# Patient Record
Sex: FEMALE | Race: WHITE | Employment: OTHER | URBAN - METROPOLITAN AREA
[De-identification: names, ages, dates, MRNs, and addresses within clinical notes are randomized per-mention and may not be internally consistent; named-entity substitution may affect disease eponyms.]

---

## 2022-10-25 ENCOUNTER — TRANSCRIBE ORDER (OUTPATIENT)
Dept: SCHEDULING | Age: 47
End: 2022-10-25

## 2022-10-25 DIAGNOSIS — M50.30 DDD (DEGENERATIVE DISC DISEASE), CERVICAL: Primary | ICD-10-CM

## 2022-11-29 ENCOUNTER — HOSPITAL ENCOUNTER (OUTPATIENT)
Dept: MAMMOGRAPHY | Age: 47
Discharge: HOME | End: 2022-11-29
Attending: FAMILY MEDICINE
Payer: COMMERCIAL

## 2022-11-29 ENCOUNTER — HOSPITAL ENCOUNTER (OUTPATIENT)
Dept: MRI IMAGING | Age: 47
Discharge: HOME | End: 2022-11-29
Attending: FAMILY MEDICINE
Payer: COMMERCIAL

## 2022-11-29 DIAGNOSIS — M50.30 DDD (DEGENERATIVE DISC DISEASE), CERVICAL: ICD-10-CM

## 2022-11-29 DIAGNOSIS — Z12.31 OTHER SCREENING MAMMOGRAM: ICD-10-CM

## 2022-11-29 PROCEDURE — 72141 MRI NECK SPINE W/O DYE: CPT

## 2022-11-29 PROCEDURE — 77063 BREAST TOMOSYNTHESIS BI: CPT

## 2024-01-16 ENCOUNTER — HOSPITAL ENCOUNTER (EMERGENCY)
Facility: HOSPITAL | Age: 49
Discharge: HOME | End: 2024-01-16
Attending: EMERGENCY MEDICINE
Payer: COMMERCIAL

## 2024-01-16 VITALS
HEIGHT: 66 IN | BODY MASS INDEX: 32.72 KG/M2 | HEART RATE: 77 BPM | OXYGEN SATURATION: 99 % | RESPIRATION RATE: 16 BRPM | TEMPERATURE: 97.2 F | WEIGHT: 203.6 LBS | DIASTOLIC BLOOD PRESSURE: 97 MMHG | SYSTOLIC BLOOD PRESSURE: 159 MMHG

## 2024-01-16 DIAGNOSIS — R10.13 ABDOMINAL PAIN, EPIGASTRIC: Primary | ICD-10-CM

## 2024-01-16 LAB
ALBUMIN SERPL-MCNC: 3.8 G/DL (ref 3.5–5)
ALBUMIN/GLOB SERPL: 1 (ref 1.1–2.2)
ALP SERPL-CCNC: 108 U/L (ref 45–117)
ALT SERPL-CCNC: 26 U/L (ref 12–78)
ANION GAP SERPL CALC-SCNC: 14 MMOL/L (ref 5–15)
AST SERPL W P-5'-P-CCNC: 28 U/L (ref 15–37)
BASOPHILS # BLD: 0.1 K/UL (ref 0–0.1)
BASOPHILS NFR BLD: 1 % (ref 0–1)
BILIRUB SERPL-MCNC: 0.4 MG/DL (ref 0.2–1)
BUN SERPL-MCNC: 17 MG/DL (ref 6–20)
BUN/CREAT SERPL: 22 (ref 12–20)
CA-I BLD-MCNC: 9.1 MG/DL (ref 8.5–10.1)
CHLORIDE SERPL-SCNC: 103 MMOL/L (ref 97–108)
CO2 SERPL-SCNC: 23 MMOL/L (ref 21–32)
CREAT SERPL-MCNC: 0.77 MG/DL (ref 0.55–1.02)
DIFFERENTIAL METHOD BLD: ABNORMAL
EOSINOPHIL # BLD: 0.7 K/UL (ref 0–0.4)
EOSINOPHIL NFR BLD: 9 % (ref 0–7)
ERYTHROCYTE [DISTWIDTH] IN BLOOD BY AUTOMATED COUNT: 13.5 % (ref 11.5–14.5)
GLOBULIN SER CALC-MCNC: 3.7 G/DL (ref 2–4)
GLUCOSE SERPL-MCNC: 107 MG/DL (ref 65–100)
HCT VFR BLD AUTO: 37.1 % (ref 35–47)
HGB BLD-MCNC: 12.7 G/DL (ref 11.5–16)
IMM GRANULOCYTES # BLD AUTO: 0 K/UL (ref 0–0.04)
IMM GRANULOCYTES NFR BLD AUTO: 0 % (ref 0–0.5)
LIPASE SERPL-CCNC: 32 U/L (ref 13–75)
LYMPHOCYTES # BLD: 2.4 K/UL (ref 0.8–3.5)
LYMPHOCYTES NFR BLD: 31 % (ref 12–49)
MCH RBC QN AUTO: 30.8 PG (ref 26–34)
MCHC RBC AUTO-ENTMCNC: 34.2 G/DL (ref 30–36.5)
MCV RBC AUTO: 90 FL (ref 80–99)
MONOCYTES # BLD: 0.4 K/UL (ref 0–1)
MONOCYTES NFR BLD: 5 % (ref 5–13)
NEUTS SEG # BLD: 4.2 K/UL (ref 1.8–8)
NEUTS SEG NFR BLD: 54 % (ref 32–75)
NRBC # BLD: 0 K/UL (ref 0–0.01)
NRBC BLD-RTO: 0 PER 100 WBC
PLATELET # BLD AUTO: 250 K/UL (ref 150–400)
PMV BLD AUTO: 10.2 FL (ref 8.9–12.9)
POTASSIUM SERPL-SCNC: 3.5 MMOL/L (ref 3.5–5.1)
PROT SERPL-MCNC: 7.5 G/DL (ref 6.4–8.2)
RBC # BLD AUTO: 4.12 M/UL (ref 3.8–5.2)
SODIUM SERPL-SCNC: 140 MMOL/L (ref 136–145)
TROPONIN I SERPL HS-MCNC: 13 NG/L (ref 0–51)
WBC # BLD AUTO: 7.7 K/UL (ref 3.6–11)

## 2024-01-16 PROCEDURE — 99284 EMERGENCY DEPT VISIT MOD MDM: CPT

## 2024-01-16 PROCEDURE — 2580000003 HC RX 258: Performed by: EMERGENCY MEDICINE

## 2024-01-16 PROCEDURE — 93005 ELECTROCARDIOGRAM TRACING: CPT | Performed by: EMERGENCY MEDICINE

## 2024-01-16 PROCEDURE — 83690 ASSAY OF LIPASE: CPT

## 2024-01-16 PROCEDURE — 84484 ASSAY OF TROPONIN QUANT: CPT

## 2024-01-16 PROCEDURE — 85025 COMPLETE CBC W/AUTO DIFF WBC: CPT

## 2024-01-16 PROCEDURE — 36415 COLL VENOUS BLD VENIPUNCTURE: CPT

## 2024-01-16 PROCEDURE — 2500000003 HC RX 250 WO HCPCS: Performed by: EMERGENCY MEDICINE

## 2024-01-16 PROCEDURE — A4216 STERILE WATER/SALINE, 10 ML: HCPCS | Performed by: EMERGENCY MEDICINE

## 2024-01-16 PROCEDURE — 80053 COMPREHEN METABOLIC PANEL: CPT

## 2024-01-16 PROCEDURE — 96374 THER/PROPH/DIAG INJ IV PUSH: CPT

## 2024-01-16 RX ORDER — LISINOPRIL 10 MG/1
TABLET ORAL
COMMUNITY

## 2024-01-16 RX ORDER — BUTALBITAL, ACETAMINOPHEN AND CAFFEINE 50; 325; 40 MG/1; MG/1; MG/1
TABLET ORAL
COMMUNITY
Start: 2024-01-14

## 2024-01-16 RX ORDER — NICOTINE POLACRILEX 4 MG/1
20 GUM, CHEWING ORAL 2 TIMES DAILY
COMMUNITY

## 2024-01-16 RX ORDER — DULOXETIN HYDROCHLORIDE 20 MG/1
CAPSULE, DELAYED RELEASE ORAL
COMMUNITY
Start: 2024-01-16

## 2024-01-16 RX ADMIN — FAMOTIDINE 20 MG: 10 INJECTION, SOLUTION INTRAVENOUS at 19:46

## 2024-01-16 ASSESSMENT — ENCOUNTER SYMPTOMS
RESPIRATORY NEGATIVE: 1
VOMITING: 0
NAUSEA: 0
ABDOMINAL PAIN: 1
BLOOD IN STOOL: 0

## 2024-01-16 ASSESSMENT — PAIN DESCRIPTION - DESCRIPTORS: DESCRIPTORS: DULL;SHARP

## 2024-01-16 ASSESSMENT — LIFESTYLE VARIABLES
HOW OFTEN DO YOU HAVE A DRINK CONTAINING ALCOHOL: MONTHLY OR LESS
HOW MANY STANDARD DRINKS CONTAINING ALCOHOL DO YOU HAVE ON A TYPICAL DAY: 1 OR 2

## 2024-01-16 ASSESSMENT — PAIN DESCRIPTION - ORIENTATION: ORIENTATION: MID;UPPER

## 2024-01-16 ASSESSMENT — PAIN - FUNCTIONAL ASSESSMENT
PAIN_FUNCTIONAL_ASSESSMENT: ACTIVITIES ARE NOT PREVENTED
PAIN_FUNCTIONAL_ASSESSMENT: 0-10

## 2024-01-16 ASSESSMENT — PAIN DESCRIPTION - PAIN TYPE: TYPE: ACUTE PAIN

## 2024-01-16 ASSESSMENT — PAIN DESCRIPTION - LOCATION: LOCATION: ABDOMEN

## 2024-01-16 ASSESSMENT — PAIN DESCRIPTION - FREQUENCY: FREQUENCY: INTERMITTENT

## 2024-01-16 ASSESSMENT — PAIN SCALES - GENERAL: PAINLEVEL_OUTOF10: 5

## 2024-01-17 DIAGNOSIS — K21.9 GASTROESOPHAGEAL REFLUX DISEASE WITHOUT ESOPHAGITIS: Primary | ICD-10-CM

## 2024-01-17 DIAGNOSIS — R73.03 PREDIABETES: ICD-10-CM

## 2024-01-17 PROBLEM — R10.9 ABDOMINAL PAIN: Status: ACTIVE | Noted: 2024-01-17

## 2024-01-17 PROBLEM — F32.A DEPRESSION: Status: ACTIVE | Noted: 2024-01-17

## 2024-01-17 PROBLEM — J45.909 ASTHMA: Status: ACTIVE | Noted: 2024-01-17

## 2024-01-17 LAB
EKG ATRIAL RATE: 71 BPM
EKG DIAGNOSIS: NORMAL
EKG P AXIS: 20 DEGREES
EKG P-R INTERVAL: 138 MS
EKG Q-T INTERVAL: 418 MS
EKG QRS DURATION: 94 MS
EKG QTC CALCULATION (BAZETT): 455 MS
EKG R AXIS: 14 DEGREES
EKG T AXIS: 2 DEGREES
EKG VENTRICULAR RATE: 71 BPM

## 2024-01-17 RX ORDER — TOPIRAMATE 50 MG/1
50 TABLET, FILM COATED ORAL DAILY
COMMUNITY

## 2024-01-17 NOTE — ED NOTES
[facility] EMERGENCY DEPT  EMERGENCY DEPARTMENT ENCOUNTER      Pt Name: Chloe Soriano  MRN: [id number]  Birthdate 1975  Date of evaluation: 1/16/2024  Provider: Jose Cruz MD  7:50 PM    CHIEF COMPLAINT       Chief Complaint   Patient presents with    Abdominal Pain         HISTORY OF PRESENT ILLNESS    Chloe Soriano is a 48 y.o. female with history of ulcers, hypertension and depression, who presents to the emergency department with complaint of epigastric abdominal pain that gradually started 2 months ago, but intensified tonight.  Pain became severe but at present is 0-1/10.  She took omeprazole for years and then stopped for a long time and restarted it recently.  All along, pain did not radiate but tonight it seemed to radiate to the left upper quadrant.  She denies hematemesis or tarry stools.      Nursing Notes were reviewed.    REVIEW OF SYSTEMS       Review of Systems   Constitutional: Negative.    HENT: Negative.     Respiratory: Negative.     Cardiovascular: Negative.    Gastrointestinal:  Positive for abdominal pain. Negative for blood in stool, nausea and vomiting.   Endocrine: Negative.    Genitourinary: Negative.    Musculoskeletal: Negative.    Neurological: Negative.    Hematological: Negative.        Except as noted above the remainder of the review of systems was reviewed and negative.       PAST MEDICAL HISTORY     Past Medical History:   Diagnosis Date    Colitis     Hypertension     Migraine          SURGICAL HISTORY       Past Surgical History:   Procedure Laterality Date    HYSTERECTOMY (CERVIX STATUS UNKNOWN)           CURRENT MEDICATIONS       Previous Medications    BUTALBITAL-ACETAMINOPHEN-CAFFEINE (FIORICET, ESGIC) -40 MG PER TABLET        DULOXETINE (CYMBALTA) 20 MG EXTENDED RELEASE CAPSULE        LISINOPRIL (PRINIVIL;ZESTRIL) 10 MG TABLET    TAKE 1 TABLET BY MOUTH ONCE DAILY NEED APPOINTMENT    OMEPRAZOLE 20 MG EC TABLET    Take 1 tablet by mouth daily       ALLERGIES

## 2024-01-17 NOTE — ED TRIAGE NOTES
Pt reports intermittent episodes of mid-epigastric pain (from umbilical region to beneath breast) that lasts a few minutes to a couple of hours.  Pt denies any N/V/D at this time.  Patient states that pain starts suddenly and is sporadic.  Pt is afebrile.  Pain radiates to back.  Last normal BM on yesterday.

## 2024-01-19 ENCOUNTER — OFFICE VISIT (OUTPATIENT)
Age: 49
End: 2024-01-19

## 2024-01-19 ENCOUNTER — PREP FOR PROCEDURE (OUTPATIENT)
Age: 49
End: 2024-01-19

## 2024-01-19 VITALS
RESPIRATION RATE: 14 BRPM | OXYGEN SATURATION: 96 % | BODY MASS INDEX: 32.88 KG/M2 | WEIGHT: 204.6 LBS | SYSTOLIC BLOOD PRESSURE: 120 MMHG | HEIGHT: 66 IN | DIASTOLIC BLOOD PRESSURE: 90 MMHG | TEMPERATURE: 97.4 F | HEART RATE: 79 BPM

## 2024-01-19 DIAGNOSIS — R10.13 ABDOMINAL PAIN, EPIGASTRIC: ICD-10-CM

## 2024-01-19 DIAGNOSIS — K21.9 GASTROESOPHAGEAL REFLUX DISEASE, UNSPECIFIED WHETHER ESOPHAGITIS PRESENT: ICD-10-CM

## 2024-01-19 DIAGNOSIS — R14.0 BLOATING: ICD-10-CM

## 2024-01-19 DIAGNOSIS — R10.13 EPIGASTRIC PAIN: Primary | ICD-10-CM

## 2024-01-19 RX ORDER — DULOXETIN HYDROCHLORIDE 60 MG/1
CAPSULE, DELAYED RELEASE ORAL
COMMUNITY
Start: 2024-01-16

## 2024-01-19 RX ORDER — ACETAMINOPHEN 160 MG
TABLET,DISINTEGRATING ORAL
COMMUNITY

## 2024-01-19 RX ORDER — M-VIT,TX,IRON,MINS/CALC/FOLIC 27MG-0.4MG
1 TABLET ORAL DAILY
COMMUNITY

## 2024-01-19 ASSESSMENT — PATIENT HEALTH QUESTIONNAIRE - PHQ9
1. LITTLE INTEREST OR PLEASURE IN DOING THINGS: 1
SUM OF ALL RESPONSES TO PHQ QUESTIONS 1-9: 2
SUM OF ALL RESPONSES TO PHQ9 QUESTIONS 1 & 2: 2
SUM OF ALL RESPONSES TO PHQ QUESTIONS 1-9: 2
2. FEELING DOWN, DEPRESSED OR HOPELESS: 1

## 2024-01-20 ASSESSMENT — ENCOUNTER SYMPTOMS
RECTAL PAIN: 0
ABDOMINAL DISTENTION: 0
VOMITING: 0
BLOOD IN STOOL: 0
DIARRHEA: 1
CONSTIPATION: 0
NAUSEA: 0
ANAL BLEEDING: 0
RESPIRATORY NEGATIVE: 1
ALLERGIC/IMMUNOLOGIC NEGATIVE: 1
ABDOMINAL PAIN: 1

## 2024-01-20 NOTE — PROGRESS NOTES
1. Have you been to the ER, urgent care clinic since your last visit?  Hospitalized since your last visit? 1-16-24   ABD PAIN    2. Have you seen or consulted any other health care providers outside of the [facility] since your last visit?  Include any pap smears or colon screening. NO   Chief Complaint   Patient presents with    New Patient    Abdominal Pain     UPPER ABD PAIN FOR 2 MONTHS.  20 YRS AGO HAD ULCERS AND COLLAGENOUS COLITIS  COLD CHILLS SOMETIMES  DULL ACHY UPPER ABD PAIN     BP (!) 120/90 (Site: Left Upper Arm, Position: Sitting, Cuff Size: Large Adult)   Pulse 79   Temp 97.4 °F (36.3 °C) (Temporal)   Resp 14   Ht 1.676 m (5' 6\")   Wt 92.8 kg (204 lb 9.6 oz)   SpO2 96% Comment: ROOM AIR  BMI 33.02 kg/m²       
oriented to person, place, and time. Mental status is at baseline.   Psychiatric:         Mood and Affect: Mood normal.         Behavior: Behavior normal.         Thought Content: Thought content normal.         Judgment: Judgment normal.        1. Epigastric pain  Will schedule patient for EGD to further evaluate the cause of her epigastric abdominal pain.  - LA ESOPHAGOGASTRODUODENOSCOPY TRANSORAL DIAGNOSTIC    2. Bloating      3. Gastroesophageal reflux disease, unspecified whether esophagitis present

## 2024-01-20 NOTE — H&P (VIEW-ONLY)
Other        Social History     Socioeconomic History    Marital status:      Spouse name: Not on file    Number of children: Not on file    Years of education: Not on file    Highest education level: Not on file   Occupational History    Not on file   Tobacco Use    Smoking status: Never    Smokeless tobacco: Not on file   Vaping Use    Vaping Use: Never used   Substance and Sexual Activity    Alcohol use: Yes     Comment: socially    Drug use: Never    Sexual activity: Not on file   Other Topics Concern    Not on file   Social History Narrative    Not on file     Social Determinants of Health     Financial Resource Strain: Not on file   Food Insecurity: Not on file   Transportation Needs: Not on file   Physical Activity: Not on file   Stress: Not on file   Social Connections: Not on file   Intimate Partner Violence: Not on file   Housing Stability: Not on file         48-year-old female with history of hypertension, gastroesophageal reflux disease, migraine headaches, asthma, and depression who comes in for evaluation of upper abdominal pain.  Patient gives a long history of epigastric abdominal pain.  She states it is worse after eating.  The pain is dull and radiates down to her navel.  She has cold chills at times.  She has been taking omeprazole once daily.  She is eating on a bland diet.  The pain is intermittent.  She has bloating..  She went to the emergency room on 1/16/2024 with the abdominal pain which radiated to her left side.  She states that pain may last for 1 to 4 hours per episode.  They increased the omeprazole to twice daily dosing which did give some help.  No nausea or vomiting.  She states she rarely may take NSAIDs but did take more in the past.  She has a history of peptic ulcer disease she has a history of peptic ulcer disease and colitis approximately 20 years ago.  Patient states her stools may pass a little when she urinates.  She states other times they may be large.  She has  had incontinence of stool as well as a lot of urgency.  No gross GI bleeding.    Abdominal Pain  Associated symptoms include diarrhea. Pertinent negatives include no constipation, nausea or vomiting.         Review of Systems   Constitutional: Negative.    HENT:  Negative for nosebleeds.    Respiratory: Negative.     Cardiovascular: Negative.    Gastrointestinal:  Positive for abdominal pain and diarrhea. Negative for abdominal distention, anal bleeding, blood in stool, constipation, nausea, rectal pain and vomiting.   Genitourinary: Negative.    Musculoskeletal: Negative.    Skin: Negative.    Allergic/Immunologic: Negative.    Neurological: Negative.    Hematological: Negative.    Psychiatric/Behavioral: Negative.     All other systems reviewed and are negative.          BP (!) 120/90 (Site: Left Upper Arm, Position: Sitting, Cuff Size: Large Adult)   Pulse 79   Temp 97.4 °F (36.3 °C) (Temporal)   Resp 14   Ht 1.676 m (5' 6\")   Wt 92.8 kg (204 lb 9.6 oz)   SpO2 96% Comment: ROOM AIR  BMI 33.02 kg/m²     Physical Exam  Vitals and nursing note reviewed.   Constitutional:       Appearance: Normal appearance. She is obese.   HENT:      Head: Normocephalic and atraumatic.   Eyes:      General: No scleral icterus.  Cardiovascular:      Rate and Rhythm: Normal rate and regular rhythm.      Pulses: Normal pulses.      Heart sounds: Normal heart sounds.   Pulmonary:      Effort: Pulmonary effort is normal.      Breath sounds: Normal breath sounds.   Abdominal:      General: There is no distension.      Palpations: There is no mass.      Tenderness: There is abdominal tenderness. There is guarding. There is no right CVA tenderness, left CVA tenderness or rebound.      Hernia: No hernia is present.   Musculoskeletal:      Right lower leg: No edema.      Left lower leg: No edema.   Skin:     Coloration: Skin is not jaundiced.   Neurological:      General: No focal deficit present.      Mental Status: She is alert and

## 2024-01-22 ENCOUNTER — TELEPHONE (OUTPATIENT)
Age: 49
End: 2024-01-22

## 2024-01-25 ENCOUNTER — ANESTHESIA (OUTPATIENT)
Facility: HOSPITAL | Age: 49
End: 2024-01-25
Payer: COMMERCIAL

## 2024-01-25 ENCOUNTER — ANESTHESIA EVENT (OUTPATIENT)
Facility: HOSPITAL | Age: 49
End: 2024-01-25
Payer: COMMERCIAL

## 2024-01-25 ENCOUNTER — HOSPITAL ENCOUNTER (OUTPATIENT)
Facility: HOSPITAL | Age: 49
Setting detail: OUTPATIENT SURGERY
Discharge: HOME | End: 2024-01-25
Attending: INTERNAL MEDICINE | Admitting: INTERNAL MEDICINE
Payer: COMMERCIAL

## 2024-01-25 VITALS
SYSTOLIC BLOOD PRESSURE: 155 MMHG | HEART RATE: 79 BPM | BODY MASS INDEX: 33.41 KG/M2 | HEIGHT: 65 IN | WEIGHT: 200.5 LBS | RESPIRATION RATE: 18 BRPM | OXYGEN SATURATION: 96 % | DIASTOLIC BLOOD PRESSURE: 90 MMHG | TEMPERATURE: 97.1 F

## 2024-01-25 PROCEDURE — 3600007512: Performed by: INTERNAL MEDICINE

## 2024-01-25 PROCEDURE — 6360000002 HC RX W HCPCS: Performed by: NURSE ANESTHETIST, CERTIFIED REGISTERED

## 2024-01-25 PROCEDURE — 7100000011 HC PHASE II RECOVERY - ADDTL 15 MIN: Performed by: INTERNAL MEDICINE

## 2024-01-25 PROCEDURE — 43239 EGD BIOPSY SINGLE/MULTIPLE: CPT | Performed by: INTERNAL MEDICINE

## 2024-01-25 PROCEDURE — 2500000003 HC RX 250 WO HCPCS: Performed by: NURSE ANESTHETIST, CERTIFIED REGISTERED

## 2024-01-25 PROCEDURE — 88305 TISSUE EXAM BY PATHOLOGIST: CPT

## 2024-01-25 PROCEDURE — 7100000010 HC PHASE II RECOVERY - FIRST 15 MIN: Performed by: INTERNAL MEDICINE

## 2024-01-25 PROCEDURE — 3600007502: Performed by: INTERNAL MEDICINE

## 2024-01-25 PROCEDURE — 3700000001 HC ADD 15 MINUTES (ANESTHESIA): Performed by: INTERNAL MEDICINE

## 2024-01-25 PROCEDURE — 2580000003 HC RX 258: Performed by: INTERNAL MEDICINE

## 2024-01-25 PROCEDURE — 3700000000 HC ANESTHESIA ATTENDED CARE: Performed by: INTERNAL MEDICINE

## 2024-01-25 PROCEDURE — 2709999900 HC NON-CHARGEABLE SUPPLY: Performed by: INTERNAL MEDICINE

## 2024-01-25 RX ORDER — PROPOFOL 10 MG/ML
INJECTION, EMULSION INTRAVENOUS PRN
Status: DISCONTINUED | OUTPATIENT
Start: 2024-01-25 | End: 2024-01-25 | Stop reason: SDUPTHER

## 2024-01-25 RX ORDER — SODIUM CHLORIDE 9 MG/ML
25 INJECTION, SOLUTION INTRAVENOUS PRN
Status: DISCONTINUED | OUTPATIENT
Start: 2024-01-25 | End: 2024-01-25 | Stop reason: HOSPADM

## 2024-01-25 RX ORDER — GLYCOPYRROLATE 0.2 MG/ML
INJECTION INTRAMUSCULAR; INTRAVENOUS PRN
Status: DISCONTINUED | OUTPATIENT
Start: 2024-01-25 | End: 2024-01-25 | Stop reason: SDUPTHER

## 2024-01-25 RX ORDER — LIDOCAINE HYDROCHLORIDE 20 MG/ML
INJECTION, SOLUTION EPIDURAL; INFILTRATION; INTRACAUDAL; PERINEURAL PRN
Status: DISCONTINUED | OUTPATIENT
Start: 2024-01-25 | End: 2024-01-25 | Stop reason: SDUPTHER

## 2024-01-25 RX ADMIN — GLYCOPYRROLATE 0.2 MG: 0.2 INJECTION INTRAMUSCULAR; INTRAVENOUS at 13:08

## 2024-01-25 RX ADMIN — LIDOCAINE HYDROCHLORIDE 60 MG: 20 INJECTION, SOLUTION EPIDURAL; INFILTRATION; INTRACAUDAL; PERINEURAL at 13:08

## 2024-01-25 RX ADMIN — PROPOFOL 100 MG: 10 INJECTION, EMULSION INTRAVENOUS at 13:22

## 2024-01-25 RX ADMIN — SODIUM CHLORIDE 25 ML: 9 INJECTION, SOLUTION INTRAVENOUS at 11:06

## 2024-01-25 ASSESSMENT — PAIN - FUNCTIONAL ASSESSMENT
PAIN_FUNCTIONAL_ASSESSMENT: 0-10
PAIN_FUNCTIONAL_ASSESSMENT: 0-10

## 2024-01-25 ASSESSMENT — PAIN SCALES - GENERAL: PAINLEVEL_OUTOF10: 0

## 2024-01-25 NOTE — ANESTHESIA POSTPROCEDURE EVALUATION
Department of Anesthesiology  Postprocedure Note    Patient: Chloe Soriano  MRN: [id number]  YOB: 1975  Date of evaluation: 1/25/2024    Procedure Summary       Date: 01/25/24 Room / Location: [facility] ENDO 01 / [facility] ENDOSCOPY    Anesthesia Start: 1308 Anesthesia Stop: 1335    Procedure: EGD ESOPHAGOGASTRODUODENOSCOPY WITH BIOPSY (Mouth) Diagnosis:       Abdominal pain, epigastric      (Abdominal pain, epigastric [R10.13])    Surgeons: Truman De La Torre Jr., MD Responsible Provider: Hasmukh Mendes APRN - CRNA    Anesthesia Type: MAC ASA Status: 2            Anesthesia Type: MAC    Mari Phase I: Mari Score: 10    Mari Phase II:      Anesthesia Post Evaluation    Patient location during evaluation: bedside  Patient participation: complete - patient participated  Level of consciousness: sleepy but conscious  Pain score: 0  Airway patency: patent  Nausea & Vomiting: no vomiting and no nausea  Cardiovascular status: blood pressure returned to baseline  Respiratory status: room air  Hydration status: stable  Multimodal analgesia pain management approach    No notable events documented.

## 2024-01-25 NOTE — DISCHARGE INSTRUCTIONS
For the next 12 hours you should not:   1. drive   2. drink alcohol   3. operate any machinery   4. engage in activities that require mental sharpness or manual dexterity such as     cooking   5. take any drugs other than those prescribed by a physician   6. make any legal or financial decisions    Call your doctor's office immediately, if there is is anything unusual:   1. increased and continuing rectal bleeding   2. fever   3. Unusual abdominal pain    Take it easy today and resume normal activity tomorrow.It is common to have gas and mild bloating for a few hours. Pain is NOT normal. You may be groggy off and on for a few hours.    Resume previous diet.        Truman De La Torre Jr, MD  1/25/2024  1:34 PM

## 2024-01-25 NOTE — ANESTHESIA PRE PROCEDURE
Department of Anesthesiology  Preprocedure Note       Name:  Chloe Soriano   Age:  48 y.o.  :  1975                                          MRN:  [id number]         Date:  2024      Surgeon: Surgeon(s):  Truman De La Torre Jr., MD    Procedure: Procedure(s):  EGD ESOPHAGOGASTRODUODENOSCOPY WITH BIOPSY    Medications prior to admission:   Prior to Admission medications    Medication Sig Start Date End Date Taking? Authorizing Provider   DULoxetine (CYMBALTA) 60 MG extended release capsule TAKE 1 CAPSULE BY MOUTH ONCE DAILY TAKE WITH 20 MG CAP DOSE INCREASE 24   Prema Hernandez MD   Multiple Vitamins-Minerals (THERAPEUTIC MULTIVITAMIN-MINERALS) tablet Take 1 tablet by mouth daily    Prema Hernandez MD   Cholecalciferol (VITAMIN D3) 50 MCG (2000) CAPS Take by mouth    Prema Hernandez MD   Doxylamine Succinate, Sleep, (UNISOM PO) Take by mouth FOR SLEEP    Prema Hernandez MD   topiramate (TOPAMAX) 50 MG tablet Take 1 tablet by mouth daily    Prema Hernandez MD   omeprazole 20 MG EC tablet Take 1 tablet by mouth in the morning and at bedtime TAKING 1 BID    Prema Hernandez MD   lisinopril (PRINIVIL;ZESTRIL) 10 MG tablet TAKE 1 TABLET BY MOUTH ONCE DAILY NEED APPOINTMENT    ProviderPrema MD   DULoxetine (CYMBALTA) 20 MG extended release capsule  24   Prema Hernandez MD   butalbital-acetaminophen-caffeine (FIORICET, ESGIC) -40 MG per tablet  24   Prema Hernandez MD       Current medications:    Current Facility-Administered Medications   Medication Dose Route Frequency Provider Last Rate Last Admin   • 0.9 % sodium chloride infusion  25 mL IntraVENous PRN Truman De La Torre Jr.,  mL/hr at 24 1106 25 mL at 24 1106       Allergies:    Allergies   Allergen Reactions   • Gadolinium Derivatives Anaphylaxis   • Iodinated Contrast Media Anaphylaxis   • Codeine Nausea And Vomiting       Problem List:    Patient Active

## 2024-01-25 NOTE — INTERVAL H&P NOTE
Update History & Physical    The patient's History and Physical of January 25, 2024 was reviewed with the patient and I examined the patient. There was no change. The surgical site was confirmed by the patient and me.     Plan: The risks, benefits, expected outcome, and alternative to the recommended procedure have been discussed with the patient. Patient understands and wants to proceed with the procedure.     Electronically signed by Truman De La Torre Jr, MD on 1/25/2024 at 10:43 AM

## 2024-01-25 NOTE — OP NOTE
EGD Procedure Note        Patient: Chloe Soriano MRN: [id number]  SSN: [id number]    YOB: 1975  Age: 48 y.o.  Sex: female        Date/Time:  1/25/2024 1:29 PM         IMPRESSION:       Antral gastritis  Distal esophagitis (grade 2)  Decreased LES tone       RECOMMENDATIONS:    Check biopsy results.  Continue the omeprazole 20 mg twice daily.    Procedure: Esophagogastroduodenoscopy with cold biopsy    Indication: Epigastric abdominal pain    Endoscopist:  Truman De La Torre Jr, MD    Referring Provider:   Gisella Linder APRN - NP    History: The history and physical exam were reviewed and updated.     Endoscope: GIF H190 Olympus video endoscope    Extent of Exam: second portion of the duodenum    ASA: ASA 2 - Patient with mild systemic disease with no functional limitations    Anethesia/Sedation:  TIVA    Description of the procedure:   The procedure was discussed with the patient including risks, benefits, alternatives including risks of iv sedation, bleeding, perforation and aspiration.  A safety timeout was performed. The patient was placed in the left lateral decubitus position.  A bite block was placed.  The patient was using standard protocol.  The patients vital signs were monitored at all times including heart rate/rhythm, blood pressure and oxygen saturation.  The endoscope was then passed under direct visualization to the second portion of the duodenum.  The endoscope was then slowly withdrawn while visualizing the mucosa.  In the stomach a retroflexion was performed and gastric fundus and cardia visualized. The patient was then transferred to recovery in stable condition.                Findings:   Esophagus:The esophageal mucosa was inflamed throughout the distal esophagus consistent with a grade 2 esophagitis.  Patient also had decreased LES tone.  Stomach: The gastric mucosa was inflamed throughout the gastric antrum.  Multiple biopsies taken there..   Duodenum: The

## 2024-01-31 ENCOUNTER — TELEPHONE (OUTPATIENT)
Age: 49
End: 2024-01-31

## 2024-01-31 NOTE — TELEPHONE ENCOUNTER
I spoke with Chloe, she wanted the results of EGD, I explained Dr De La Torre hasn;t reviewed yet. She said she is taking 80 mg of Omeprazole a day now to controll stomach prob. I told her I would send a message to Dr De La Torre about this and call her back. She said ok.

## (undated) DEVICE — PAD,PREPPING,CUFFED,24X48,7",NONSTERILE: Brand: MEDLINE

## (undated) DEVICE — GLOVE ORANGE PI 7 1/2   MSG9075

## (undated) DEVICE — JELLY,LUBE,STERILE,FLIP TOP,TUBE,4-OZ: Brand: MEDLINE

## (undated) DEVICE — LINE SAMPLING ADVANCE ORAL NASAL MICROSTREAM O2 TUBING 6.5'

## (undated) DEVICE — SPONGE GZ W4XL4IN COT 12 PLY TYP VII WVN C FLD DSGN STERILE

## (undated) DEVICE — TUBING, SUCTION, 9/32" X 10', STRAIGHT: Brand: MEDLINE

## (undated) DEVICE — 1200CC GUARDIAN II: Brand: GUARDIAN

## (undated) DEVICE — SOLUTION IRRIG 1000ML STRL H2O USP PLAS POUR BTL